# Patient Record
Sex: MALE
[De-identification: names, ages, dates, MRNs, and addresses within clinical notes are randomized per-mention and may not be internally consistent; named-entity substitution may affect disease eponyms.]

---

## 2020-06-04 ENCOUNTER — APPOINTMENT (OUTPATIENT)
Dept: PULMONOLOGY | Facility: CLINIC | Age: 60
End: 2020-06-04
Payer: COMMERCIAL

## 2020-06-04 VITALS
HEIGHT: 66 IN | DIASTOLIC BLOOD PRESSURE: 80 MMHG | BODY MASS INDEX: 23.14 KG/M2 | HEART RATE: 57 BPM | RESPIRATION RATE: 16 BRPM | SYSTOLIC BLOOD PRESSURE: 120 MMHG | TEMPERATURE: 98.6 F | OXYGEN SATURATION: 98 % | WEIGHT: 144 LBS

## 2020-06-04 DIAGNOSIS — Z86.39 PERSONAL HISTORY OF OTHER ENDOCRINE, NUTRITIONAL AND METABOLIC DISEASE: ICD-10-CM

## 2020-06-04 DIAGNOSIS — Z87.891 PERSONAL HISTORY OF NICOTINE DEPENDENCE: ICD-10-CM

## 2020-06-04 PROBLEM — Z00.00 ENCOUNTER FOR PREVENTIVE HEALTH EXAMINATION: Status: ACTIVE | Noted: 2020-06-04

## 2020-06-04 PROCEDURE — 99202 OFFICE O/P NEW SF 15 MIN: CPT

## 2020-06-04 RX ORDER — SIMVASTATIN 10 MG/1
10 TABLET, FILM COATED ORAL
Refills: 0 | Status: ACTIVE | COMMUNITY

## 2020-06-04 RX ORDER — SITAGLIPTIN 100 MG/1
100 TABLET, FILM COATED ORAL
Refills: 0 | Status: ACTIVE | COMMUNITY

## 2020-06-04 RX ORDER — DAPAGLIFLOZIN 10 MG/1
10 TABLET, FILM COATED ORAL
Refills: 0 | Status: ACTIVE | COMMUNITY

## 2020-06-04 NOTE — PHYSICAL EXAM
[Normal Oropharynx] : normal oropharynx [No Acute Distress] : no acute distress [Normal Appearance] : normal appearance [Normal S1, S2] : normal s1, s2 [Clear to Auscultation Bilaterally] : clear to auscultation bilaterally [No Abnormalities] : no abnormalities [No HSM] : no hsm [Normal Gait] : normal gait [No Edema] : no edema [No Clubbing] : no clubbing [No Motor Deficits] : no motor deficits [Normal Turgor] : normal turgor [Oriented x3] : oriented x3

## 2020-06-04 NOTE — HISTORY OF PRESENT ILLNESS
[Former] : former [Never] : never [TextBox_4] : 59 year old man who had COVID -19 in April 2020. He had low grade fever and myalgias. These have improved but at times notes that he has trouble raising phlegm. No shortness of breath or chest pain. No wheezing. No prior history of pulmonary disease.  [YearQuit] : 2000

## 2020-06-04 NOTE — DISCUSSION/SUMMARY
[FreeTextEntry1] : He is a 59 year old man with a history of DM II  who had COVID -19 in April 2020. He had low grade fever and myalgias. These have improved but at times notes that he has trouble raising phlegm. No shortness of breath or chest pain. No wheezing. No prior history of pulmonary disease. \par \par May have mild reactive airways disease which can be post-viral. Will get COVID -19 antibody. PFT to be performed.

## 2020-06-05 LAB
SARS-COV-2 IGG SERPL IA-ACNC: 84.8 INDEX
SARS-COV-2 IGG SERPL QL IA: POSITIVE

## 2020-06-18 ENCOUNTER — APPOINTMENT (OUTPATIENT)
Dept: PULMONOLOGY | Facility: CLINIC | Age: 60
End: 2020-06-18
Payer: COMMERCIAL

## 2020-06-18 VITALS — HEART RATE: 57 BPM | TEMPERATURE: 98.1 F | OXYGEN SATURATION: 96 %

## 2020-06-18 DIAGNOSIS — U07.1 COVID-19: ICD-10-CM

## 2020-06-18 PROCEDURE — 94729 DIFFUSING CAPACITY: CPT

## 2020-06-18 PROCEDURE — 94727 GAS DIL/WSHOT DETER LNG VOL: CPT

## 2020-06-18 PROCEDURE — 94060 EVALUATION OF WHEEZING: CPT

## 2020-06-18 PROCEDURE — 99213 OFFICE O/P EST LOW 20 MIN: CPT | Mod: 25

## 2020-06-18 NOTE — HISTORY OF PRESENT ILLNESS
[Never] : never [Former] : former [TextBox_4] : 59 year old man who had COVID -19 in April 2020. He had low grade fever and myalgias. These have improved but at times notes that he has trouble raising phlegm. No shortness of breath or chest pain. No wheezing. No prior history of pulmonary disease. \par \par He is feeling well.  [YearQuit] : 2000

## 2020-06-18 NOTE — PHYSICAL EXAM
[No Acute Distress] : no acute distress [Normal Appearance] : normal appearance [Normal Oropharynx] : normal oropharynx [Clear to Auscultation Bilaterally] : clear to auscultation bilaterally [No Abnormalities] : no abnormalities [Normal S1, S2] : normal s1, s2 [No HSM] : no hsm [Normal Gait] : normal gait [No Edema] : no edema [No Clubbing] : no clubbing [No Motor Deficits] : no motor deficits [Normal Turgor] : normal turgor [Oriented x3] : oriented x3

## 2020-06-18 NOTE — DISCUSSION/SUMMARY
[FreeTextEntry1] : He is a 59 year old man with a history of DM II  who had COVID -19 in April 2020. He had low grade fever and myalgias. These have improved but at times notes that he has trouble raising phlegm. No shortness of breath or chest pain. No wheezing. No prior history of pulmonary disease. \par \par PFT was normal. \par \par No evident pulmonary disease. \par \par No action needed now. Will see as needed.

## 2020-06-18 NOTE — REVIEW OF SYSTEMS
[Cough] : cough [Diabetes] : diabetes [Fever] : no fever [Hemoptysis] : no hemoptysis [Postnasal Drip] : no postnasal drip [Sputum] : no sputum [Wheezing] : no wheezing [Dyspnea] : no dyspnea [Chest Discomfort] : no chest discomfort [GERD] : no gerd [Nasal Discharge] : no nasal discharge [Myalgias] : no myalgias [Nocturia] : no nocturia [Anemia] : no anemia [Headache] : no headache [Rash] : no rash [Anxiety] : no anxiety [Thyroid Problem] : no thyroid problem

## 2020-06-18 NOTE — PROCEDURE
[FreeTextEntry1] : PFT 6/18/20: Spirometry was normal. Lung volumes were normal. Diffusion was normal. No significant change after bronchodilator.

## 2020-11-17 NOTE — REVIEW OF SYSTEMS
[Fever] : no fever [Postnasal Drip] : no postnasal drip [Cough] : cough [Hemoptysis] : no hemoptysis [Dyspnea] : no dyspnea [Sputum] : no sputum [Nasal Discharge] : no nasal discharge [Chest Discomfort] : no chest discomfort [Wheezing] : no wheezing [Myalgias] : no myalgias [Nocturia] : no nocturia [GERD] : no gerd [Rash] : no rash [Anemia] : no anemia unable to perform/to be assessed [Anxiety] : no anxiety [Diabetes] : diabetes [Headache] : no headache [Thyroid Problem] : no thyroid problem